# Patient Record
Sex: FEMALE | Race: WHITE
[De-identification: names, ages, dates, MRNs, and addresses within clinical notes are randomized per-mention and may not be internally consistent; named-entity substitution may affect disease eponyms.]

---

## 2018-07-27 ENCOUNTER — HOSPITAL ENCOUNTER (OUTPATIENT)
Dept: HOSPITAL 89 - MAMO | Age: 63
End: 2018-07-27
Attending: NURSE PRACTITIONER
Payer: COMMERCIAL

## 2018-07-27 DIAGNOSIS — Z13.820: Primary | ICD-10-CM

## 2018-07-27 DIAGNOSIS — Z12.31: ICD-10-CM

## 2018-07-27 PROCEDURE — 77067 SCR MAMMO BI INCL CAD: CPT

## 2018-07-27 PROCEDURE — 77080 DXA BONE DENSITY AXIAL: CPT

## 2018-07-27 PROCEDURE — 77063 BREAST TOMOSYNTHESIS BI: CPT

## 2018-07-27 NOTE — RADIOLOGY IMAGING REPORT
FACILITY: [facility] 

 

PATIENT NAME: RIP RICHARDSON

: [id number]

MR: [id number]

V: [id number]

EXAM DATE: [id number]

ORDERING PHYSICIAN: YE HOUGH

TECHNOLOGIST: Naima Rivas

 

PROCEDURE:BILATERAL DIGITAL SCREENING MAMMOGRAM WITH CAD ASSISTED 

INTERPRETATION & 3D TOMOSYNTHESIS 

 

COMPARISON:Prior mammograms 14.

 

INDICATIONS:SCREENING

 

FINDINGS:  

Small to moderate amount of fibroglandular tissue is seen throughout 

the breasts. The parenchymal pattern has remained stable allowing for 

difference in mammographic technique & patient positioning. There is 

no evidence of malignant appearing mass, malignant appearing 

calcifications or other secondary sign of malignancy in either breast.

 

DIAGNOSTIC CATEGORY 1--NEGATIVE.  

 

RECOMMENDATIONS:

ROUTINE MAMMOGRAM AND CLINICAL EVALUATION.   

 

IMPRESSION:

BIRADS 1: Negative. 

No significant abnormality is seen.

 

 

 

 

 

 

 

 

 

Dictated by:  Isa Echevarria M.D. on 2018 at 14:37   

Transcribed by: FIX on 2018 at 14:40    

Approved by:  Isa Echevarria M.D. on 2018 at 14:42   

[facility]

## 2018-07-27 NOTE — RADIOLOGY IMAGING REPORT
FACILITY: [facility] [place] 

 

PATIENT NAME: Jennifer Lopez

: 1955

MR: [id number]

V: [id number]

EXAM DATE: 

ORDERING PHYSICIAN: YE HOUGH

TECHNOLOGIST: 

 

[place] [facility] [place]

Patient: Jennifer Lopez

: 1955

MRN: [id number]

Visit/Account:[id number]

Date of Sevice:  2018

 

ACCESSION #: [id number]

 

DEXA Scan

 

Clinical history:  Screening.

 

Comparison:  DEXA scan from -14.

 

LUMBAR SPINE:

The bone mineral density (BMD) measured from L1-L4 correlates with a Z-score of 0.9 and a T-score of 
0.3 which is Normal as defined by the World Health Organization.  The corresponding risk of fracture 
in the lumbar spine is Not increased compared with a young adult reference population.  This value ha
s increase by 1.4 % since the prior study.  More than 5% change is considered significant.

 

HIP:

Bone mineral density (BMD) measured in the LEFT total hip region correlates with a Z-score 0.9 and a 
T-score of 0.3 which is normal

 as defined by the World Health Organization.  The corresponding risk of fracture in the hip is Not i
ncreased compared to a young adult reference population. This value has decreased by 2.8 % since the 
prior study.  More than 5% change is considered significant.  T score left femoral neck -0.6

 

Bone mineral density (BMD) measured in the Femoral Neck region measures 0.954 g/cm?.

 

IMPRESSION:

1.  Lumbar spine: Normal.  There has been 1.4% increase in the bone mineral density since the previou
s exam.

2.  Left Total Hip:  Normal. There has been 2.8% decrease in the bone mineral density since the previ
ous exam.

3. Femoral Neck: Bone Mineral Density is 0.954 g/cm?

 

The next DEXA scan of this patient should include the following sites: L1-L4 and the left hip.

 

FRAX? WHO Fracture Risk Assessment Tool link:

<http://www.shef.ac.uk/FRAX/tool.jsp?locationValue=9>

 

 

PLEASE NOTE:

1)  The World Health Organization defines low BMD as follows:

                                                                      T-score

                   Normal                                  > -1

                   Osteopenia                           < -1 and  > -2.5

                   Osteoporosis                         < -2.5 without fractures

                   Established osteoporosis       < -2.5 with fractures

2)  In general, you may wish to consider:

                    Diagnosis                  Treatment                       Follow-up DEXA

 

                    Normal BMD            Prevention                      2-3 years

                    Osteopenia                Prevention/therapy         1-2 years

                    Osteoporosis             Therapy                           Yearly

3)  Fracture risk estimated from the T-score is more accurate for vertebral fractures (often spontane
ous) than for hip fractures.

 

Report Dictated By: Isa Echevarria MD at 2018 2:12 PM

 

Report E-Signed By: Isa Echevarria MD  at 2018 2:14 PM

 

WSN:[id number]

## 2018-08-15 ENCOUNTER — HOSPITAL ENCOUNTER (OUTPATIENT)
Dept: HOSPITAL 89 - OR | Age: 63
Discharge: HOME | End: 2018-08-15
Attending: SURGERY
Payer: COMMERCIAL

## 2018-08-15 VITALS — WEIGHT: 180 LBS | BODY MASS INDEX: 33.13 KG/M2 | HEIGHT: 62 IN

## 2018-08-15 VITALS — SYSTOLIC BLOOD PRESSURE: 126 MMHG | DIASTOLIC BLOOD PRESSURE: 76 MMHG

## 2018-08-15 VITALS — SYSTOLIC BLOOD PRESSURE: 139 MMHG | DIASTOLIC BLOOD PRESSURE: 80 MMHG

## 2018-08-15 VITALS — SYSTOLIC BLOOD PRESSURE: 122 MMHG | DIASTOLIC BLOOD PRESSURE: 92 MMHG

## 2018-08-15 VITALS — DIASTOLIC BLOOD PRESSURE: 87 MMHG | SYSTOLIC BLOOD PRESSURE: 124 MMHG

## 2018-08-15 VITALS — DIASTOLIC BLOOD PRESSURE: 86 MMHG | SYSTOLIC BLOOD PRESSURE: 121 MMHG

## 2018-08-15 VITALS — DIASTOLIC BLOOD PRESSURE: 85 MMHG | SYSTOLIC BLOOD PRESSURE: 116 MMHG

## 2018-08-15 VITALS — DIASTOLIC BLOOD PRESSURE: 81 MMHG | SYSTOLIC BLOOD PRESSURE: 114 MMHG

## 2018-08-15 DIAGNOSIS — K21.9: ICD-10-CM

## 2018-08-15 DIAGNOSIS — G47.33: ICD-10-CM

## 2018-08-15 DIAGNOSIS — D12.0: ICD-10-CM

## 2018-08-15 DIAGNOSIS — I10: ICD-10-CM

## 2018-08-15 DIAGNOSIS — Z12.11: Primary | ICD-10-CM

## 2018-08-15 DIAGNOSIS — J45.909: ICD-10-CM

## 2018-08-15 DIAGNOSIS — Z86.73: ICD-10-CM

## 2018-08-15 DIAGNOSIS — Z99.81: ICD-10-CM

## 2018-08-15 DIAGNOSIS — E11.9: ICD-10-CM

## 2018-08-15 DIAGNOSIS — E78.5: ICD-10-CM

## 2018-08-15 DIAGNOSIS — E66.9: ICD-10-CM

## 2018-08-15 PROCEDURE — 82948 REAGENT STRIP/BLOOD GLUCOSE: CPT

## 2018-08-15 PROCEDURE — 00811 ANES LWR INTST NDSC NOS: CPT

## 2018-08-15 PROCEDURE — 45385 COLONOSCOPY W/LESION REMOVAL: CPT

## 2018-08-15 PROCEDURE — 88305 TISSUE EXAM BY PATHOLOGIST: CPT

## 2018-08-15 PROCEDURE — 36416 COLLJ CAPILLARY BLOOD SPEC: CPT

## 2018-08-15 NOTE — SHORT(OUTPT) DISCHARGE SUMMARY
Discharge Summary


Reason for Hosp/Final Diag:  


(1) Colon cancer screening


Status:  Chronic


Hospital Course & Plan:  Colonoscopy with polypectomy x1 completed without 

problems.





Departure


Discharge to:  Home, Self Care





Discharge Instructions


Home Meds


Reported Medications


Levalbuterol Tartrate (XOPENEX HFA) 15 Gm Hfa.aer.ad, 1-2 PUFF IH PRN


   8/1/18


Multivits-Min/Iron/FA/Lutein (Centrum Silver Women Tablet) 1 Each Tablet, 1 TAB 

PO QDAY


   7/25/18


Esomeprazole Mag Trihydrate (Nexium) 40 Mg Capsule.dr, 40 MG PO QDAY, 0 Refills


   9/2/12


Metoprolol Succinate (Metoprolol Succinate) 50 Mg Tab.sr.24h, 25 MG PO QHS


   9/2/12


Atorvastatin (Lipitor) 10 Mg Tab, 20 MG PO QHS, 0 Refills


   8/31/12


Montelukast Sodium (Singulair) 10 Mg Tab, 10 MG PO QHS, 0 Refills


   8/31/12


Diet:  Regular


Activity:  As Tolerated


Special Instructions:  


Your colonoscopy was completed without problems and your prep was


excellent (Good Job!!).  I removed a small polyp from your colon and it


was sent to pathology.  My office will call you in the next week or so to


let you what the polyp is and when your next colonoscopy should be (either


5 or 10 years) depending on the pathology results.











ULLRICH,JOHN A MD Aug 15, 2018 12:15